# Patient Record
Sex: FEMALE | Race: WHITE | Employment: FULL TIME | URBAN - METROPOLITAN AREA
[De-identification: names, ages, dates, MRNs, and addresses within clinical notes are randomized per-mention and may not be internally consistent; named-entity substitution may affect disease eponyms.]

---

## 2019-05-15 ENCOUNTER — OFFICE VISIT (OUTPATIENT)
Dept: FAMILY MEDICINE CLINIC | Facility: CLINIC | Age: 59
End: 2019-05-15
Payer: COMMERCIAL

## 2019-05-15 VITALS — TEMPERATURE: 98 F

## 2019-05-15 DIAGNOSIS — Z71.85 ENCOUNTER FOR COUNSELING REGARDING IMMUNIZATION: Primary | ICD-10-CM

## 2019-05-15 DIAGNOSIS — Z23 IMMUNIZATION DUE: ICD-10-CM

## 2019-05-15 PROCEDURE — 90471 IMMUNIZATION ADMIN: CPT

## 2019-05-15 PROCEDURE — 90707 MMR VACCINE SC: CPT

## 2019-05-15 PROCEDURE — 99202 OFFICE O/P NEW SF 15 MIN: CPT

## 2019-05-15 RX ORDER — MEDROXYPROGESTERONE ACETATE 2.5 MG/1
TABLET ORAL
Refills: 12 | COMMUNITY
Start: 2019-02-22

## 2019-05-15 RX ORDER — ESTRADIOL 0.5 MG/1
TABLET ORAL
Refills: 1 | COMMUNITY
Start: 2019-03-02

## 2019-05-15 RX ORDER — VENLAFAXINE 75 MG/1
TABLET ORAL
Refills: 4 | COMMUNITY
Start: 2019-01-23

## 2019-05-15 NOTE — PROGRESS NOTES
Pt here for MMR. Also requests oral Typhoid as she will be traveling in [facility]: , [place] and [place]. She leaves next week, May 20, 2019, for work related/ self-employed business.  She states that she has traveled a lot in [place] since the early 23